# Patient Record
Sex: FEMALE | Race: WHITE | NOT HISPANIC OR LATINO | Employment: OTHER | URBAN - METROPOLITAN AREA
[De-identification: names, ages, dates, MRNs, and addresses within clinical notes are randomized per-mention and may not be internally consistent; named-entity substitution may affect disease eponyms.]

---

## 2017-07-06 ENCOUNTER — PREPPED CHART (OUTPATIENT)
Dept: URBAN - METROPOLITAN AREA CLINIC 43 | Facility: CLINIC | Age: 77
End: 2017-07-06

## 2018-01-04 ENCOUNTER — ESTABLISHED COMPREHENSIVE EXAM (OUTPATIENT)
Dept: URBAN - METROPOLITAN AREA CLINIC 43 | Facility: CLINIC | Age: 78
End: 2018-01-04

## 2018-01-04 DIAGNOSIS — H43.813: ICD-10-CM

## 2018-01-04 DIAGNOSIS — E11.9: ICD-10-CM

## 2018-01-04 PROCEDURE — 92014 COMPRE OPH EXAM EST PT 1/>: CPT

## 2018-01-04 PROCEDURE — 92015 DETERMINE REFRACTIVE STATE: CPT

## 2018-01-04 ASSESSMENT — VISUAL ACUITY
OD_CC: 20/25-2
OS_SC: J1
OD_CC: J1
OS_CC: J1
OD_SC: 20/80-2
OS_SC: 20/200
OS_CC: 20/25-2
OD_SC: J8

## 2018-01-04 ASSESSMENT — TONOMETRY
OS_IOP_MMHG: 16
OD_IOP_MMHG: 15

## 2018-10-25 NOTE — PROCEDURE NOTE: CLINICAL
PROCEDURE NOTE: Excision of Eyelid Lesion Right Upper Lid. Diagnosis: Eyelid Lesion, Uncertain Behavior. Prior to treatment, the risks/benefits/alternatives were discussed. The patient wished to proceed with procedure. Local anesthetic was given. The eyelid was prepped and draped for procedure. The lesion was incised and removed. The wound was repaired with sutures. Patient tolerated procedure well. There were no complications. Post procedure instructions given. Aster Cutting PROCEDURE NOTE: Biopsy of Eyelid Right Lower Lid. Diagnosis: Eyelid Lesion, Uncertain Behavior. Anesthesia: Local. Prior to treatment, the risks/benefits/alternatives were discussed. The patient wished to proceed with procedure. The area of the surgical site was prepped surgical scrub. 0.5 cc of 2% lidocaine with epinephrine was injected beneath the lesion. The lesion was excised with Radha scissors. The defect was cauterized. Erythromycin ointment was placed on the biopsy site. Patient tolerated procedure well. There were no complications. The lesion was placed in formalin and sent to a pathology lab. Post procedure instructions given. Aster Cutting

## 2019-03-25 ENCOUNTER — ESTABLISHED COMPREHENSIVE EXAM (OUTPATIENT)
Dept: URBAN - METROPOLITAN AREA CLINIC 43 | Facility: CLINIC | Age: 79
End: 2019-03-25

## 2019-03-25 DIAGNOSIS — H43.813: ICD-10-CM

## 2019-03-25 DIAGNOSIS — Z79.4: ICD-10-CM

## 2019-03-25 DIAGNOSIS — E11.9: ICD-10-CM

## 2019-03-25 PROCEDURE — 92015 DETERMINE REFRACTIVE STATE: CPT

## 2019-03-25 PROCEDURE — 92014 COMPRE OPH EXAM EST PT 1/>: CPT

## 2019-03-25 ASSESSMENT — VISUAL ACUITY
OS_SC: 20/200
OS_CC: 20/25-1
OD_CC: J1
OD_SC: 20/80-1
OD_SC: J8
OS_CC: J1
OD_CC: 20/25
OS_SC: J2

## 2019-03-25 ASSESSMENT — TONOMETRY
OD_IOP_MMHG: 15
OS_IOP_MMHG: 15

## 2020-07-27 ENCOUNTER — ESTABLISHED COMPREHENSIVE EXAM (OUTPATIENT)
Dept: URBAN - METROPOLITAN AREA CLINIC 43 | Facility: CLINIC | Age: 80
End: 2020-07-27

## 2020-07-27 DIAGNOSIS — H43.813: ICD-10-CM

## 2020-07-27 DIAGNOSIS — Z79.4: ICD-10-CM

## 2020-07-27 DIAGNOSIS — H52.203: ICD-10-CM

## 2020-07-27 DIAGNOSIS — E11.9: ICD-10-CM

## 2020-07-27 PROCEDURE — 92014 COMPRE OPH EXAM EST PT 1/>: CPT

## 2020-07-27 PROCEDURE — 92015 DETERMINE REFRACTIVE STATE: CPT

## 2020-07-27 ASSESSMENT — VISUAL ACUITY
OD_SC: 20/200+1
OS_CC: J1
OS_CC: 20/25+2
OD_CC: J1
OD_SC: J8
OS_SC: 20/80
OS_SC: J2
OD_CC: 20/20

## 2020-07-27 ASSESSMENT — TONOMETRY
OD_IOP_MMHG: 15
OS_IOP_MMHG: 15

## 2021-08-20 ENCOUNTER — ESTABLISHED COMPREHENSIVE EXAM (OUTPATIENT)
Dept: URBAN - METROPOLITAN AREA CLINIC 43 | Facility: CLINIC | Age: 81
End: 2021-08-20

## 2021-08-20 DIAGNOSIS — Z79.4: ICD-10-CM

## 2021-08-20 DIAGNOSIS — H43.813: ICD-10-CM

## 2021-08-20 DIAGNOSIS — E11.9: ICD-10-CM

## 2021-08-20 DIAGNOSIS — H52.203: ICD-10-CM

## 2021-08-20 PROCEDURE — 92014 COMPRE OPH EXAM EST PT 1/>: CPT

## 2021-08-20 PROCEDURE — 92015 DETERMINE REFRACTIVE STATE: CPT

## 2021-08-20 ASSESSMENT — VISUAL ACUITY
OS_CC: J1
OD_CC: J2
OS_SC: 20/300
OS_SC: J3
OS_CC: 20/25-2
OD_SC: J10
OD_CC: 20/25
OD_SC: 20/200

## 2021-08-20 ASSESSMENT — TONOMETRY
OD_IOP_MMHG: 14
OS_IOP_MMHG: 14

## 2022-10-31 ENCOUNTER — COMPREHENSIVE EXAM (OUTPATIENT)
Dept: URBAN - METROPOLITAN AREA CLINIC 43 | Facility: CLINIC | Age: 82
End: 2022-10-31

## 2022-10-31 DIAGNOSIS — E11.9: ICD-10-CM

## 2022-10-31 DIAGNOSIS — H35.363: ICD-10-CM

## 2022-10-31 DIAGNOSIS — H52.203: ICD-10-CM

## 2022-10-31 DIAGNOSIS — Z79.4: ICD-10-CM

## 2022-10-31 DIAGNOSIS — H43.813: ICD-10-CM

## 2022-10-31 PROCEDURE — 92014 COMPRE OPH EXAM EST PT 1/>: CPT

## 2022-10-31 PROCEDURE — 92015 DETERMINE REFRACTIVE STATE: CPT

## 2022-10-31 ASSESSMENT — VISUAL ACUITY
OS_SC: 20/70
OS_SC: J2
OD_SC: 20/60-2
OS_CC: J3
OD_CC: J1-
OD_SC: J8
OS_CC: 20/25-2
OD_CC: 20/25-1

## 2022-10-31 ASSESSMENT — TONOMETRY
OD_IOP_MMHG: 13
OS_IOP_MMHG: 14

## 2024-06-13 ENCOUNTER — COMPREHENSIVE EXAM (OUTPATIENT)
Dept: URBAN - METROPOLITAN AREA CLINIC 43 | Facility: CLINIC | Age: 84
End: 2024-06-13

## 2024-06-13 DIAGNOSIS — H35.363: ICD-10-CM

## 2024-06-13 DIAGNOSIS — H04.123: ICD-10-CM

## 2024-06-13 DIAGNOSIS — H43.813: ICD-10-CM

## 2024-06-13 DIAGNOSIS — Z79.4: ICD-10-CM

## 2024-06-13 DIAGNOSIS — H52.203: ICD-10-CM

## 2024-06-13 DIAGNOSIS — E11.9: ICD-10-CM

## 2024-06-13 PROCEDURE — 92014 COMPRE OPH EXAM EST PT 1/>: CPT

## 2024-06-13 PROCEDURE — 92015 DETERMINE REFRACTIVE STATE: CPT

## 2024-06-13 ASSESSMENT — VISUAL ACUITY
OD_SC: 20/80
OS_CC: 20/20-1
OD_SC: J8
OS_SC: 20/80
OS_SC: J2
OD_CC: J2
OD_CC: 20/20
OS_CC: J2

## 2024-06-13 ASSESSMENT — TONOMETRY
OS_IOP_MMHG: 10
OD_IOP_MMHG: 10

## 2025-06-20 ENCOUNTER — COMPREHENSIVE EXAM (OUTPATIENT)
Age: 85
End: 2025-06-20

## 2025-06-20 DIAGNOSIS — H43.813: ICD-10-CM

## 2025-06-20 DIAGNOSIS — E11.9: ICD-10-CM

## 2025-06-20 DIAGNOSIS — H04.123: ICD-10-CM

## 2025-06-20 DIAGNOSIS — H35.363: ICD-10-CM

## 2025-06-20 DIAGNOSIS — H52.203: ICD-10-CM

## 2025-06-20 DIAGNOSIS — Z79.4: ICD-10-CM

## 2025-06-20 PROCEDURE — 92015 DETERMINE REFRACTIVE STATE: CPT

## 2025-06-20 PROCEDURE — 92014 COMPRE OPH EXAM EST PT 1/>: CPT

## 2025-06-20 RX ORDER — NEOMYCIN SULFATE, POLYMYXIN B SULFATE AND DEXAMETHASONE 3.5; 10000; 1 MG/G; [USP'U]/G; MG/G: OINTMENT OPHTHALMIC EVERY EVENING
